# Patient Record
Sex: MALE | Race: WHITE | Employment: FULL TIME | URBAN - METROPOLITAN AREA
[De-identification: names, ages, dates, MRNs, and addresses within clinical notes are randomized per-mention and may not be internally consistent; named-entity substitution may affect disease eponyms.]

---

## 2017-11-10 ENCOUNTER — HOSPITAL ENCOUNTER (EMERGENCY)
Age: 27
Discharge: HOME | End: 2017-11-11
Attending: EMERGENCY MEDICINE
Payer: OTHER GOVERNMENT

## 2017-11-10 VITALS
SYSTOLIC BLOOD PRESSURE: 125 MMHG | DIASTOLIC BLOOD PRESSURE: 68 MMHG | TEMPERATURE: 97.9 F | OXYGEN SATURATION: 99 % | HEART RATE: 51 BPM | HEIGHT: 70 IN | RESPIRATION RATE: 17 BRPM | WEIGHT: 184.97 LBS | BODY MASS INDEX: 26.48 KG/M2

## 2017-11-10 DIAGNOSIS — H18.821 CORNEAL ABRASION DUE TO CONTACT LENS, RIGHT: Primary | ICD-10-CM

## 2017-11-10 PROCEDURE — 74011000250 HC RX REV CODE- 250: Performed by: PHYSICIAN ASSISTANT

## 2017-11-10 PROCEDURE — 99283 EMERGENCY DEPT VISIT LOW MDM: CPT

## 2017-11-10 RX ORDER — TETRACAINE HYDROCHLORIDE 5 MG/ML
1 SOLUTION OPHTHALMIC
Status: COMPLETED | OUTPATIENT
Start: 2017-11-10 | End: 2017-11-11

## 2017-11-10 RX ADMIN — FLUORESCEIN SODIUM 1 STRIP: 1 STRIP OPHTHALMIC at 23:27

## 2017-11-11 PROCEDURE — 74011000250 HC RX REV CODE- 250: Performed by: PHYSICIAN ASSISTANT

## 2017-11-11 PROCEDURE — 74011250637 HC RX REV CODE- 250/637: Performed by: PHYSICIAN ASSISTANT

## 2017-11-11 RX ORDER — OFLOXACIN 3 MG/ML
1 SOLUTION/ DROPS OPHTHALMIC
Status: COMPLETED | OUTPATIENT
Start: 2017-11-11 | End: 2017-11-11

## 2017-11-11 RX ORDER — OFLOXACIN 3 MG/ML
SOLUTION/ DROPS OPHTHALMIC
Qty: 10 ML | Refills: 0 | Status: SHIPPED | OUTPATIENT
Start: 2017-11-11

## 2017-11-11 RX ORDER — NAPROXEN 500 MG/1
500 TABLET ORAL
Qty: 20 TAB | Refills: 0 | Status: SHIPPED | OUTPATIENT
Start: 2017-11-11 | End: 2017-11-11

## 2017-11-11 RX ORDER — NAPROXEN 250 MG/1
500 TABLET ORAL
Status: COMPLETED | OUTPATIENT
Start: 2017-11-11 | End: 2017-11-11

## 2017-11-11 RX ORDER — OFLOXACIN 3 MG/ML
SOLUTION/ DROPS OPHTHALMIC
Qty: 10 ML | Refills: 0 | Status: SHIPPED | OUTPATIENT
Start: 2017-11-11 | End: 2017-11-11

## 2017-11-11 RX ORDER — NAPROXEN 500 MG/1
500 TABLET ORAL
Qty: 20 TAB | Refills: 0 | Status: SHIPPED | OUTPATIENT
Start: 2017-11-11

## 2017-11-11 RX ADMIN — NAPROXEN 500 MG: 250 TABLET ORAL at 00:43

## 2017-11-11 RX ADMIN — TETRACAINE HYDROCHLORIDE 1 DROP: 5 SOLUTION OPHTHALMIC at 00:43

## 2017-11-11 RX ADMIN — OFLOXACIN 1 DROP: 3 SOLUTION OPHTHALMIC at 00:43

## 2017-11-11 NOTE — ED PROVIDER NOTES
[facility]  EMERGENCY DEPARTMENT HISTORY AND PHYSICAL EXAM         Date of Service: 11/10/2017   Patient Name: Sheri Park   YOB: 1990  Medical Record Number: [id number]    History of Presenting Illness     Chief Complaint   Patient presents with    Eye Pain     \"its like a burning sensation in my right eye\" x 3 days. unsure of injury. History Provided By:  patient    Additional History:   Sheri Park is a 32 y.o. male without significant PMhx who presents ambulatory to the ED with cc of constant right eye burning pain with associated right eye tearing for the past 2-3 days. Pt rates current pain 5/10 and notes the pain is unchanged with using OTC eye cream. Pt reports he wears contacts and glasses. He denies recent fall/injury/trauma or exposure to a foreign body or chemicals. He notes his tetanus vaccine is UTD. Pt denies history of DM or liver/kidney/thyroid disease. He specifically denies fever, vision changes, or rash. Social Hx: denies Tobacco use    There are no other complaints, changes or physical findings at this time. Primary Care Provider: None   Specialist:    Past History     Past Medical History:   No past medical history on file. Past Surgical History:   No past surgical history on file. Family History:   No family history on file. Social History:   Social History   Substance Use Topics    Smoking status: Not on file    Smokeless tobacco: Not on file    Alcohol use Not on file        Allergies:   No Known Allergies     Review of Systems   Review of Systems   Constitutional: Negative. Negative for fever. HENT: Negative. Eyes: Positive for pain (right) and redness (right). Negative for visual disturbance.        + right eye tearing    Respiratory: Negative. Cardiovascular: Negative. Gastrointestinal: Negative. Negative for constipation, diarrhea, nausea and vomiting.         Denies liver disease Endocrine:        Denies thyroid disease   Genitourinary: Negative. Negative for dysuria. Denies kidney disease   Musculoskeletal: Negative. Skin: Negative. Negative for rash. Neurological: Negative. All other systems reviewed and are negative. Physical Exam  Physical Exam   Constitutional: He is oriented to person, place, and time. He appears well-developed and well-nourished. No distress. Eyes: EOM are normal. Pupils are equal, round, and reactive to light. Right eye exhibits no discharge. Left eye exhibits no discharge. No scleral icterus. BL eye injection   Neck: Normal range of motion. Neck supple. No tracheal deviation present. Cardiovascular: Normal rate, regular rhythm, normal heart sounds and intact distal pulses. Exam reveals no gallop and no friction rub. No murmur heard. Pulmonary/Chest: Effort normal and breath sounds normal. No respiratory distress. He has no wheezes. He has no rales. He exhibits no tenderness. Musculoskeletal: He exhibits no edema or tenderness. Lymphadenopathy:     He has no cervical adenopathy. Neurological: He is alert and oriented to person, place, and time. No cranial nerve deficit. Coordination normal.   Skin: Skin is warm and dry. No rash noted. No erythema. Psychiatric: He has a normal mood and affect. His behavior is normal. Judgment and thought content normal.   Nursing note and vitals reviewed. Medical Decision Making   I am the first provider for this patient. I reviewed the vital signs, available nursing notes, past medical history, past surgical history, family history and social history. Old Medical Records: Old medical records. Provider Notes:   DDx: corneal abrasion, foreign body, conjunctivitis. ED Course:  12:20 AM   Initial assessment performed. The patients presenting problems have been discussed, and they are in agreement with the care plan formulated and outlined with them.   I have encouraged them to ask questions as they arise throughout their visit. Procedures:     Procedure Note - Wood's lamp exam:  12:24 AM  Performed by: Hany Romo PA-C. Pts right eye was anesthetized with tetracaine, stained with fluorescein, and examined with a Wood's lamp, using lid eversion. Foreign body: no  Fluorescein uptake: yes, showing hazy abrasion to the cornea of the right eye. The procedure took 1-15 minutes, and pt tolerated well. Vital Signs-Reviewed the patient's vital signs. Patient Vitals for the past 12 hrs:   Temp Pulse Resp BP SpO2   11/10/17 2256 97.9 °F (36.6 °C) (!) 51 17 125/68 99 %       Medications Given in the ED:  Medications   tetracaine HCl (PF) (PONTOCAINE) 0.5 % ophthalmic solution 1 Drop (1 Drop Both Eyes Given by Provider 11/11/17 0043)   fluorescein (FUL-RICARDA) 1 mg ophthalmic strip 1 Strip (1 Strip Both Eyes Given by Provider 11/10/17 2327)   ofloxacin (FLOXIN) 0.3 % ophthalmic solution 1 Drop (1 Drop Right Eye Given 11/11/17 0043)   naproxen (NAPROSYN) tablet 500 mg (500 mg Oral Given 11/11/17 0043)       Diagnosis:  Clinical Impression:   1. Corneal abrasion due to contact lens, right         Plan:  1:   Follow-up Information     Follow up With Details Comments [address] [place] [address] Opthalmologist  if no improvement     [facility] EMERGENCY DEPT  If symptoms worsen [address] [place] [address]  [phone]          2:   Discharge Medication List as of 11/11/2017 12:40 AM      START taking these medications    Details   ofloxacin (FLOXIN) 0.3 % ophthalmic solution Put 2 drops in 4 times a day for five days. , Print, Disp-10 mL, R-0      naproxen (NAPROSYN) 500 mg tablet Take 1 Tab by mouth every twelve (12) hours as needed for Pain., Print, Disp-20 Tab, R-0           Return to ED if worse. Disposition:    DISCHARGE NOTE  12:34 AM  The patient has been re-evaluated and is ready for discharge. Reviewed available results with patient. Counseled pt on diagnosis and care plan. Pt has expressed understanding, and all questions have been answered. Pt agrees with plan and agrees to F/U as recommended, or return to the ED if their sxs worsen. Discharge instructions have been provided and explained to the pt, along with reasons to return to the ED. Written by Abbie Soria, ED Scribe, as dictated by Hany Romo PA-C.  _______________________________   Attestations: This note is prepared by Abbie Soria, acting as Scribe for [employer]. Hany Romo PA-C: The scribe's documentation has been prepared under my direction and personally reviewed by me in its entirety.  I confirm that the note above accurately reflects all work, treatment, procedures, and medical decision making performed by me.  _______________________________

## 2017-11-11 NOTE — ED NOTES
Assumed care of pt from triage. Pt complaining of right eye pain, itching, watering x3 days. Both eyes appear red on assessment. No swelling noted. Pt resting quietly and in no acute distress at this time. Call bell within reach.